# Patient Record
Sex: MALE | Race: WHITE | NOT HISPANIC OR LATINO | Employment: OTHER | URBAN - METROPOLITAN AREA
[De-identification: names, ages, dates, MRNs, and addresses within clinical notes are randomized per-mention and may not be internally consistent; named-entity substitution may affect disease eponyms.]

---

## 2022-03-17 PROBLEM — R97.20 ELEVATED PSA: Status: ACTIVE | Noted: 2022-03-17

## 2022-03-17 PROBLEM — N50.811 PAIN IN RIGHT TESTICLE: Status: ACTIVE | Noted: 2022-03-17

## 2022-04-14 PROBLEM — F17.200 NICOTINE USE DISORDER: Status: ACTIVE | Noted: 2022-04-14

## 2022-04-14 PROBLEM — C62.91: Status: ACTIVE | Noted: 2022-04-14

## 2022-04-14 PROBLEM — J43.2 CENTRILOBULAR EMPHYSEMA: Status: ACTIVE | Noted: 2022-04-14

## 2022-04-14 PROBLEM — E87.1 HYPONATREMIA: Status: ACTIVE | Noted: 2022-04-14

## 2022-04-14 PROBLEM — G62.9 NEUROPATHY: Status: ACTIVE | Noted: 2022-04-14

## 2022-04-14 PROBLEM — C61 PROSTATE CANCER: Status: ACTIVE | Noted: 2022-04-14

## 2022-04-14 PROBLEM — E11.9 TYPE 2 DIABETES MELLITUS, WITHOUT LONG-TERM CURRENT USE OF INSULIN: Status: ACTIVE | Noted: 2022-04-14

## 2022-04-15 PROBLEM — F41.9 ANXIETY: Status: ACTIVE | Noted: 2022-04-15

## 2022-04-15 PROBLEM — E87.1 HYPONATREMIA: Status: RESOLVED | Noted: 2022-04-14 | Resolved: 2022-04-15

## 2022-04-15 PROBLEM — F32.A DEPRESSION: Status: ACTIVE | Noted: 2022-04-15

## 2022-04-16 PROBLEM — E83.51 HYPOCALCEMIA: Status: ACTIVE | Noted: 2022-04-16

## 2022-04-16 PROBLEM — E83.39 HYPOPHOSPHATEMIA: Status: ACTIVE | Noted: 2022-04-16

## 2022-04-18 PROBLEM — E83.39 HYPOPHOSPHATEMIA: Status: RESOLVED | Noted: 2022-04-16 | Resolved: 2022-04-18

## 2022-04-19 PROBLEM — M79.89 SWELLING OF LEFT LOWER EXTREMITY: Status: ACTIVE | Noted: 2022-04-19

## 2022-05-25 PROBLEM — D72.9 NEUTROPHILIC LEUKOCYTOSIS: Status: ACTIVE | Noted: 2022-05-25

## 2022-05-25 PROBLEM — R74.8 ELEVATED ALKALINE PHOSPHATASE LEVEL: Status: ACTIVE | Noted: 2022-05-25

## 2022-05-25 PROBLEM — R07.89 OTHER CHEST PAIN: Status: ACTIVE | Noted: 2022-05-25

## 2022-05-26 PROBLEM — E83.42 HYPOMAGNESEMIA: Status: ACTIVE | Noted: 2022-05-26

## 2022-05-31 PROBLEM — R07.89 OTHER CHEST PAIN: Status: RESOLVED | Noted: 2022-05-25 | Resolved: 2022-05-31

## 2022-05-31 PROBLEM — E83.42 HYPOMAGNESEMIA: Status: RESOLVED | Noted: 2022-05-26 | Resolved: 2022-05-31

## 2022-06-15 PROBLEM — D70.1 CHEMOTHERAPY-INDUCED NEUTROPENIA: Status: ACTIVE | Noted: 2022-06-15

## 2022-06-15 PROBLEM — K02.9 DENTAL CARIES: Status: ACTIVE | Noted: 2022-06-15

## 2022-06-15 PROBLEM — T45.1X5A CHEMOTHERAPY-INDUCED NEUTROPENIA: Status: ACTIVE | Noted: 2022-06-15

## 2022-08-12 PROBLEM — K02.9 DENTAL CARIES: Status: RESOLVED | Noted: 2022-06-15 | Resolved: 2022-08-12

## 2022-08-12 PROBLEM — E83.51 HYPOCALCEMIA: Status: RESOLVED | Noted: 2022-04-16 | Resolved: 2022-08-12

## 2022-08-12 PROBLEM — N50.811 PAIN IN RIGHT TESTICLE: Status: RESOLVED | Noted: 2022-03-17 | Resolved: 2022-08-12

## 2022-08-12 PROBLEM — R97.20 ELEVATED PSA: Status: RESOLVED | Noted: 2022-03-17 | Resolved: 2022-08-12

## 2022-12-27 PROBLEM — T45.1X5A CHEMOTHERAPY-INDUCED NEUTROPENIA: Status: RESOLVED | Noted: 2022-06-15 | Resolved: 2022-12-27

## 2022-12-27 PROBLEM — D70.1 CHEMOTHERAPY-INDUCED NEUTROPENIA: Status: RESOLVED | Noted: 2022-06-15 | Resolved: 2022-12-27

## 2022-12-27 PROBLEM — R74.8 ELEVATED ALKALINE PHOSPHATASE LEVEL: Status: RESOLVED | Noted: 2022-05-25 | Resolved: 2022-12-27

## 2022-12-27 PROBLEM — D72.9 NEUTROPHILIC LEUKOCYTOSIS: Status: RESOLVED | Noted: 2022-05-25 | Resolved: 2022-12-27

## 2023-03-01 PROBLEM — H61.23 BILATERAL IMPACTED CERUMEN: Status: ACTIVE | Noted: 2023-03-01

## 2023-03-01 PROBLEM — E11.29 TYPE 2 DIABETES MELLITUS WITH KIDNEY COMPLICATION, WITH LONG-TERM CURRENT USE OF INSULIN: Status: ACTIVE | Noted: 2022-04-14

## 2023-03-01 PROBLEM — Z79.4 TYPE 2 DIABETES MELLITUS WITH KIDNEY COMPLICATION, WITH LONG-TERM CURRENT USE OF INSULIN: Status: ACTIVE | Noted: 2022-04-14

## 2023-03-01 PROBLEM — Z51.11 ADMISSION FOR ANTINEOPLASTIC CHEMOTHERAPY: Status: ACTIVE | Noted: 2023-03-01

## 2023-03-04 PROBLEM — C80.1 CANCER: Status: ACTIVE | Noted: 2023-03-04

## 2023-03-29 PROBLEM — J44.1 COPD EXACERBATION: Status: ACTIVE | Noted: 2023-03-29

## 2023-03-29 PROBLEM — F17.210 CIGARETTE NICOTINE DEPENDENCE WITHOUT COMPLICATION: Status: ACTIVE | Noted: 2022-04-14

## 2023-03-31 PROBLEM — M62.838 MUSCLE SPASM: Status: ACTIVE | Noted: 2023-03-31

## 2023-04-02 PROBLEM — B34.8 INFECTION DUE TO HUMAN METAPNEUMOVIRUS (HMPV): Status: ACTIVE | Noted: 2023-04-02

## 2023-04-02 PROBLEM — R50.9 FEVER: Status: ACTIVE | Noted: 2023-04-02

## 2023-04-02 PROBLEM — E87.20 METABOLIC ACIDOSIS: Status: ACTIVE | Noted: 2023-04-02

## 2023-04-03 PROBLEM — J96.01 ACUTE HYPOXEMIC RESPIRATORY FAILURE: Status: ACTIVE | Noted: 2023-04-03

## 2023-04-03 PROBLEM — A41.9 SEVERE SEPSIS: Status: ACTIVE | Noted: 2023-04-03

## 2023-04-03 PROBLEM — R65.20 SEVERE SEPSIS: Status: ACTIVE | Noted: 2023-04-03

## 2023-04-03 PROBLEM — J18.9 BILATERAL PNEUMONIA: Status: ACTIVE | Noted: 2023-04-03

## 2023-04-04 PROBLEM — I47.20 VENTRICULAR TACHYCARDIA: Status: ACTIVE | Noted: 2023-04-04

## 2023-04-04 PROBLEM — R00.0 TACHYCARDIA: Status: ACTIVE | Noted: 2023-04-04

## 2023-04-05 PROBLEM — D72.819 LEUKOPENIA: Status: ACTIVE | Noted: 2023-04-05

## 2023-04-05 PROBLEM — N17.9 AKI (ACUTE KIDNEY INJURY): Status: ACTIVE | Noted: 2023-04-05

## 2023-04-05 PROBLEM — D64.9 ANEMIA: Status: ACTIVE | Noted: 2023-04-05

## 2023-04-06 PROBLEM — T45.1X5A LEUKOPENIA DUE TO ANTINEOPLASTIC CHEMOTHERAPY: Status: ACTIVE | Noted: 2023-04-05

## 2023-04-06 PROBLEM — D70.1 LEUKOPENIA DUE TO ANTINEOPLASTIC CHEMOTHERAPY: Status: ACTIVE | Noted: 2023-04-05

## 2023-04-06 PROBLEM — T45.1X5A ANTINEOPLASTIC CHEMOTHERAPY INDUCED ANEMIA: Status: ACTIVE | Noted: 2023-04-05

## 2023-04-06 PROBLEM — D64.81 ANTINEOPLASTIC CHEMOTHERAPY INDUCED ANEMIA: Status: ACTIVE | Noted: 2023-04-05

## 2023-04-08 PROBLEM — J18.9 PNEUMONIA OF BOTH LOWER LOBES DUE TO INFECTIOUS ORGANISM: Status: ACTIVE | Noted: 2023-04-08

## 2023-04-08 PROBLEM — J15.211 STAPHYLOCOCCUS AUREUS PNEUMONIA: Status: ACTIVE | Noted: 2023-04-03

## 2023-04-20 PROBLEM — D69.6 THROMBOCYTOPENIA: Status: ACTIVE | Noted: 2023-04-20

## 2023-04-20 PROBLEM — D61.82 MYELOPHTHISIC ANEMIA: Status: ACTIVE | Noted: 2023-04-20

## 2023-04-20 PROBLEM — Z71.6 ENCOUNTER FOR TOBACCO USE CESSATION COUNSELING: Status: ACTIVE | Noted: 2023-04-20

## 2023-04-20 PROBLEM — R30.0 DYSURIA: Status: ACTIVE | Noted: 2023-04-20

## 2023-04-20 PROBLEM — D75.89 BICYTOPENIA: Status: ACTIVE | Noted: 2023-04-20

## 2023-04-20 PROBLEM — R05.1 ACUTE COUGH: Status: ACTIVE | Noted: 2023-04-20

## 2023-04-20 PROBLEM — Z71.89 ACP (ADVANCE CARE PLANNING): Status: ACTIVE | Noted: 2023-04-20

## 2023-04-21 PROBLEM — I95.0 IDIOPATHIC HYPOTENSION: Status: ACTIVE | Noted: 2023-04-21

## 2023-04-22 PROBLEM — R30.0 DYSURIA: Status: RESOLVED | Noted: 2023-04-20 | Resolved: 2023-04-22

## 2023-04-24 PROBLEM — I95.0 IDIOPATHIC HYPOTENSION: Status: RESOLVED | Noted: 2023-04-21 | Resolved: 2023-04-24

## 2023-05-02 PROBLEM — D61.818 PANCYTOPENIA: Status: ACTIVE | Noted: 2023-05-02

## 2023-05-17 PROBLEM — E16.2 HYPOGLYCEMIA: Status: ACTIVE | Noted: 2023-05-17

## 2023-05-18 PROBLEM — E16.2 HYPOGLYCEMIA: Status: RESOLVED | Noted: 2023-05-17 | Resolved: 2023-05-18

## 2023-05-20 PROBLEM — T45.1X5A ANTINEOPLASTIC CHEMOTHERAPY INDUCED PANCYTOPENIA: Status: ACTIVE | Noted: 2023-05-20

## 2023-05-20 PROBLEM — G62.0 CHEMOTHERAPY-INDUCED PERIPHERAL NEUROPATHY: Status: ACTIVE | Noted: 2022-04-14

## 2023-05-20 PROBLEM — E83.42 HYPOMAGNESEMIA: Status: RESOLVED | Noted: 2022-05-26 | Resolved: 2023-05-20

## 2023-05-20 PROBLEM — D61.810 ANTINEOPLASTIC CHEMOTHERAPY INDUCED PANCYTOPENIA: Status: ACTIVE | Noted: 2023-05-20

## 2023-05-20 PROBLEM — T45.1X5A CHEMOTHERAPY-INDUCED PERIPHERAL NEUROPATHY: Status: ACTIVE | Noted: 2022-04-14

## 2023-05-25 PROBLEM — G62.9 NEUROPATHY: Status: ACTIVE | Noted: 2023-05-25

## 2023-05-31 PROBLEM — N17.9 AKI (ACUTE KIDNEY INJURY): Status: RESOLVED | Noted: 2023-04-05 | Resolved: 2023-05-31

## 2023-05-31 PROBLEM — D64.9 SYMPTOMATIC ANEMIA: Status: ACTIVE | Noted: 2023-05-31

## 2023-06-13 PROBLEM — C62.90 SEMINOMA: Status: ACTIVE | Noted: 2023-03-04

## 2023-06-13 PROBLEM — D75.89 BICYTOPENIA: Status: ACTIVE | Noted: 2023-06-13

## 2023-07-03 PROBLEM — J96.01 ACUTE HYPOXEMIC RESPIRATORY FAILURE: Status: RESOLVED | Noted: 2023-04-03 | Resolved: 2023-07-03

## 2023-07-03 PROBLEM — A41.9 SEVERE SEPSIS: Status: RESOLVED | Noted: 2023-04-03 | Resolved: 2023-07-03

## 2023-07-03 PROBLEM — R65.20 SEVERE SEPSIS: Status: RESOLVED | Noted: 2023-04-03 | Resolved: 2023-07-03

## 2023-07-05 PROBLEM — I70.209 SUPERFICIAL FEMORAL ARTERY OCCLUSION: Status: ACTIVE | Noted: 2023-07-05

## 2023-07-10 PROBLEM — J18.9 PNEUMONIA OF BOTH LOWER LOBES DUE TO INFECTIOUS ORGANISM: Status: RESOLVED | Noted: 2023-04-08 | Resolved: 2023-07-10

## 2023-07-10 PROBLEM — J15.211 STAPHYLOCOCCUS AUREUS PNEUMONIA: Status: RESOLVED | Noted: 2023-04-03 | Resolved: 2023-07-10

## 2023-10-26 PROBLEM — G93.40 ACUTE ENCEPHALOPATHY: Status: ACTIVE | Noted: 2023-10-26

## 2023-10-26 PROBLEM — Z99.11 ON MECHANICALLY ASSISTED VENTILATION: Status: ACTIVE | Noted: 2023-10-26

## 2023-10-26 PROBLEM — E87.1 HYPONATREMIA: Status: RESOLVED | Noted: 2022-04-14 | Resolved: 2023-10-26

## 2023-10-26 PROBLEM — R56.9 SEIZURE: Status: ACTIVE | Noted: 2023-10-26

## 2023-10-26 PROBLEM — E83.42 HYPOMAGNESEMIA: Status: RESOLVED | Noted: 2022-05-26 | Resolved: 2023-10-26

## 2023-10-26 PROBLEM — J44.1 COPD EXACERBATION: Status: RESOLVED | Noted: 2023-03-29 | Resolved: 2023-10-26

## 2023-10-26 PROBLEM — E87.20 METABOLIC ACIDOSIS: Status: RESOLVED | Noted: 2023-04-02 | Resolved: 2023-10-26

## 2023-10-26 PROBLEM — H61.23 BILATERAL IMPACTED CERUMEN: Status: RESOLVED | Noted: 2023-03-01 | Resolved: 2023-10-26

## 2023-10-26 PROBLEM — Z51.11 ADMISSION FOR ANTINEOPLASTIC CHEMOTHERAPY: Status: RESOLVED | Noted: 2023-03-01 | Resolved: 2023-10-26

## 2023-10-26 PROBLEM — R05.1 ACUTE COUGH: Status: RESOLVED | Noted: 2023-04-20 | Resolved: 2023-10-26

## 2023-10-26 PROBLEM — R50.9 FEVER: Status: RESOLVED | Noted: 2023-04-02 | Resolved: 2023-10-26

## 2023-10-26 PROBLEM — M62.838 MUSCLE SPASM: Status: RESOLVED | Noted: 2023-03-31 | Resolved: 2023-10-26

## 2023-10-26 PROBLEM — J44.9 CHRONIC OBSTRUCTIVE AIRWAY DISEASE: Status: ACTIVE | Noted: 2023-10-26

## 2023-10-26 PROBLEM — R00.0 TACHYCARDIA: Status: RESOLVED | Noted: 2023-04-04 | Resolved: 2023-10-26

## 2023-10-26 PROBLEM — H55.09 HORIZONTAL NYSTAGMUS: Status: ACTIVE | Noted: 2023-10-26

## 2023-10-26 PROBLEM — M79.89 SWELLING OF LEFT LOWER EXTREMITY: Status: RESOLVED | Noted: 2022-04-19 | Resolved: 2023-10-26

## 2023-10-27 PROBLEM — E86.1 HYPOVOLEMIA: Status: ACTIVE | Noted: 2023-10-27

## 2023-10-27 PROBLEM — R41.82 AMS (ALTERED MENTAL STATUS): Status: ACTIVE | Noted: 2023-10-27

## 2023-10-27 PROBLEM — H55.00 NYSTAGMUS: Status: ACTIVE | Noted: 2023-10-27

## 2023-10-27 PROBLEM — Z78.9 ON ENTERAL NUTRITION: Status: ACTIVE | Noted: 2023-10-27

## 2023-10-28 PROBLEM — E86.1 HYPOVOLEMIA: Status: RESOLVED | Noted: 2023-10-27 | Resolved: 2023-10-28

## 2023-10-28 PROBLEM — H55.00 NYSTAGMUS: Status: RESOLVED | Noted: 2023-10-27 | Resolved: 2023-10-28

## 2023-10-28 PROBLEM — R41.82 AMS (ALTERED MENTAL STATUS): Status: RESOLVED | Noted: 2023-10-27 | Resolved: 2023-10-28

## 2023-10-29 PROBLEM — Z78.9 ON ENTERAL NUTRITION: Status: RESOLVED | Noted: 2023-10-27 | Resolved: 2023-10-29

## 2023-10-29 PROBLEM — Z99.11 ON MECHANICALLY ASSISTED VENTILATION: Status: RESOLVED | Noted: 2023-10-26 | Resolved: 2023-10-29

## 2023-10-29 PROBLEM — H55.09 HORIZONTAL NYSTAGMUS: Status: RESOLVED | Noted: 2023-10-26 | Resolved: 2023-10-29

## 2023-11-02 ENCOUNTER — PATIENT OUTREACH (OUTPATIENT)
Dept: ADMINISTRATIVE | Facility: CLINIC | Age: 58
End: 2023-11-02

## 2023-11-02 NOTE — PROGRESS NOTES
C3 nurse attempted to contact Manjinder Lu for a TCC post hospital discharge follow up call. No answer.  No voicemail.  The patient does not have a scheduled HOSFU appointment, and the pt does not have an [facility] PCP.

## 2023-11-03 NOTE — PROGRESS NOTES
C3 nurse spoke with Manjinder Lu for a TCC post hospital discharge follow up call. The patient has a scheduled \[facility]\"" appointment with Elie Fisher MD on 11/06/23 @ 0800.

## 2024-01-09 ENCOUNTER — TELEPHONE (OUTPATIENT)
Dept: SMOKING CESSATION | Facility: CLINIC | Age: 59
End: 2024-01-09

## 2024-01-10 ENCOUNTER — TELEPHONE (OUTPATIENT)
Dept: SMOKING CESSATION | Facility: CLINIC | Age: 59
End: 2024-01-10

## 2024-01-29 PROBLEM — Z95.828 S/P INSERTION OF ENDOVASCULAR THORACIC AORTIC STENT GRAFT: Status: ACTIVE | Noted: 2024-01-29
